# Patient Record
Sex: MALE | Race: WHITE | Employment: OTHER | URBAN - METROPOLITAN AREA
[De-identification: names, ages, dates, MRNs, and addresses within clinical notes are randomized per-mention and may not be internally consistent; named-entity substitution may affect disease eponyms.]

---

## 2017-06-23 ENCOUNTER — TELEPHONE (OUTPATIENT)
Dept: SURGERY | Age: 82
End: 2017-06-23

## 2017-06-23 ENCOUNTER — OFFICE VISIT (OUTPATIENT)
Dept: SURGERY | Age: 82
End: 2017-06-23

## 2017-06-23 VITALS
SYSTOLIC BLOOD PRESSURE: 128 MMHG | HEIGHT: 69 IN | TEMPERATURE: 97.5 F | WEIGHT: 145 LBS | DIASTOLIC BLOOD PRESSURE: 77 MMHG | BODY MASS INDEX: 21.48 KG/M2 | HEART RATE: 64 BPM

## 2017-06-23 DIAGNOSIS — C43.59 MALIGNANT MELANOMA OF CHEST WALL (HCC): Primary | ICD-10-CM

## 2017-06-23 PROCEDURE — 4040F PNEUMOC VAC/ADMIN/RCVD: CPT | Performed by: SURGERY

## 2017-06-23 PROCEDURE — 1036F TOBACCO NON-USER: CPT | Performed by: SURGERY

## 2017-06-23 PROCEDURE — 99215 OFFICE O/P EST HI 40 MIN: CPT | Performed by: SURGERY

## 2017-06-23 PROCEDURE — G8420 CALC BMI NORM PARAMETERS: HCPCS | Performed by: SURGERY

## 2017-06-23 PROCEDURE — 1123F ACP DISCUSS/DSCN MKR DOCD: CPT | Performed by: SURGERY

## 2017-06-23 PROCEDURE — G8428 CUR MEDS NOT DOCUMENT: HCPCS | Performed by: SURGERY

## 2017-06-23 RX ORDER — TADALAFIL 20 MG
TABLET ORAL
Refills: 3 | COMMUNITY
Start: 2017-03-23 | End: 2018-01-09

## 2017-06-30 ENCOUNTER — HOSPITAL ENCOUNTER (OUTPATIENT)
Dept: PREADMISSION TESTING | Age: 82
Discharge: HOME | End: 2017-06-30
Attending: SURGERY | Admitting: SURGERY

## 2017-06-30 DIAGNOSIS — C43.59 MALIGNANT MELANOMA OF OTHER PART OF TRUNK (HCC): ICD-10-CM

## 2017-07-05 ENCOUNTER — HOSPITAL ENCOUNTER (OUTPATIENT)
Dept: SURGERY | Age: 82
Discharge: HOME | End: 2017-07-05
Admitting: SURGERY

## 2017-07-05 VITALS
SYSTOLIC BLOOD PRESSURE: 138 MMHG | RESPIRATION RATE: 20 BRPM | DIASTOLIC BLOOD PRESSURE: 65 MMHG | HEIGHT: 69 IN | HEART RATE: 76 BPM | TEMPERATURE: 96.4 F | OXYGEN SATURATION: 94 % | WEIGHT: 144 LBS | BODY MASS INDEX: 21.33 KG/M2

## 2017-07-05 DIAGNOSIS — C43.59 MALIGNANT MELANOMA OF CHEST WALL (HCC): ICD-10-CM

## 2017-07-05 DIAGNOSIS — C43.59 MALIGNANT MELANOMA OF OTHER PART OF TRUNK (HCC): ICD-10-CM

## 2017-07-05 PROCEDURE — 13101 CMPLX RPR TRUNK 2.6-7.5 CM: CPT | Performed by: SURGERY

## 2017-07-05 PROCEDURE — 38900 IO MAP OF SENT LYMPH NODE: CPT | Performed by: SURGERY

## 2017-07-05 PROCEDURE — 38525 BIOPSY/REMOVAL LYMPH NODES: CPT | Performed by: SURGERY

## 2017-07-05 PROCEDURE — 11604 EXC TR-EXT MAL+MARG 3.1-4 CM: CPT | Performed by: SURGERY

## 2017-07-05 PROCEDURE — 13102 CMPLX RPR TRUNK ADDL 5CM/<: CPT | Performed by: SURGERY

## 2017-07-05 RX ORDER — FENTANYL CITRATE 50 UG/ML
25 INJECTION, SOLUTION INTRAMUSCULAR; INTRAVENOUS EVERY 5 MIN PRN
Status: DISCONTINUED | OUTPATIENT
Start: 2017-07-05 | End: 2017-07-06 | Stop reason: HOSPADM

## 2017-07-05 RX ORDER — SODIUM CHLORIDE, SODIUM LACTATE, POTASSIUM CHLORIDE, CALCIUM CHLORIDE 600; 310; 30; 20 MG/100ML; MG/100ML; MG/100ML; MG/100ML
INJECTION, SOLUTION INTRAVENOUS CONTINUOUS
Status: DISCONTINUED | OUTPATIENT
Start: 2017-07-05 | End: 2017-07-06 | Stop reason: HOSPADM

## 2017-07-05 RX ORDER — CHLORHEXIDINE GLUCONATE 0.12 MG/ML
15 RINSE ORAL 2 TIMES DAILY
Status: DISCONTINUED | OUTPATIENT
Start: 2017-07-05 | End: 2017-07-06 | Stop reason: HOSPADM

## 2017-07-05 RX ORDER — OXYCODONE HYDROCHLORIDE AND ACETAMINOPHEN 5; 325 MG/1; MG/1
1 TABLET ORAL EVERY 4 HOURS PRN
Status: DISCONTINUED | OUTPATIENT
Start: 2017-07-05 | End: 2017-07-05

## 2017-07-05 RX ORDER — OXYCODONE HYDROCHLORIDE AND ACETAMINOPHEN 5; 325 MG/1; MG/1
1 TABLET ORAL EVERY 4 HOURS PRN
Qty: 10 TABLET | Refills: 0 | Status: SHIPPED | OUTPATIENT
Start: 2017-07-05 | End: 2017-07-18

## 2017-07-05 RX ORDER — ONDANSETRON 2 MG/ML
4 INJECTION INTRAMUSCULAR; INTRAVENOUS
Status: ACTIVE | OUTPATIENT
Start: 2017-07-05 | End: 2017-07-05

## 2017-07-05 RX ORDER — OXYCODONE HYDROCHLORIDE AND ACETAMINOPHEN 5; 325 MG/1; MG/1
1 TABLET ORAL EVERY 4 HOURS PRN
Status: DISCONTINUED | OUTPATIENT
Start: 2017-07-05 | End: 2017-07-06 | Stop reason: HOSPADM

## 2017-07-05 RX ORDER — MEPERIDINE HYDROCHLORIDE 25 MG/ML
12.5 INJECTION INTRAMUSCULAR; INTRAVENOUS; SUBCUTANEOUS EVERY 5 MIN PRN
Status: DISCONTINUED | OUTPATIENT
Start: 2017-07-05 | End: 2017-07-06 | Stop reason: HOSPADM

## 2017-07-05 RX ORDER — LABETALOL HYDROCHLORIDE 5 MG/ML
5 INJECTION, SOLUTION INTRAVENOUS EVERY 10 MIN PRN
Status: DISCONTINUED | OUTPATIENT
Start: 2017-07-05 | End: 2017-07-06 | Stop reason: HOSPADM

## 2017-07-05 RX ADMIN — Medication 800 MICRO CURIE: at 08:45

## 2017-07-05 RX ADMIN — FENTANYL CITRATE 25 MCG: 50 INJECTION, SOLUTION INTRAMUSCULAR; INTRAVENOUS at 12:41

## 2017-07-05 RX ADMIN — SODIUM CHLORIDE, SODIUM LACTATE, POTASSIUM CHLORIDE, CALCIUM CHLORIDE: 600; 310; 30; 20 INJECTION, SOLUTION INTRAVENOUS at 10:04

## 2017-07-05 RX ADMIN — FENTANYL CITRATE 25 MCG: 50 INJECTION, SOLUTION INTRAMUSCULAR; INTRAVENOUS at 13:02

## 2017-07-05 RX ADMIN — FENTANYL CITRATE 25 MCG: 50 INJECTION, SOLUTION INTRAMUSCULAR; INTRAVENOUS at 12:49

## 2017-07-05 ASSESSMENT — PAIN SCALES - GENERAL
PAINLEVEL_OUTOF10: 0
PAINLEVEL_OUTOF10: 5
PAINLEVEL_OUTOF10: 5
PAINLEVEL_OUTOF10: 2
PAINLEVEL_OUTOF10: 4
PAINLEVEL_OUTOF10: 5

## 2017-07-05 ASSESSMENT — PAIN - FUNCTIONAL ASSESSMENT: PAIN_FUNCTIONAL_ASSESSMENT: 0-10

## 2017-07-05 ASSESSMENT — PAIN DESCRIPTION - PROGRESSION: CLINICAL_PROGRESSION: GRADUALLY IMPROVING

## 2017-07-10 ENCOUNTER — TELEPHONE (OUTPATIENT)
Dept: SURGERY | Age: 82
End: 2017-07-10

## 2017-07-18 ENCOUNTER — OFFICE VISIT (OUTPATIENT)
Dept: SURGERY | Age: 82
End: 2017-07-18

## 2017-07-18 VITALS
OXYGEN SATURATION: 95 % | TEMPERATURE: 97.5 F | HEART RATE: 75 BPM | DIASTOLIC BLOOD PRESSURE: 69 MMHG | SYSTOLIC BLOOD PRESSURE: 119 MMHG | WEIGHT: 144 LBS | BODY MASS INDEX: 21.27 KG/M2

## 2017-07-18 DIAGNOSIS — Z98.890 POSTOPERATIVE STATE: Primary | ICD-10-CM

## 2017-07-18 PROCEDURE — 99024 POSTOP FOLLOW-UP VISIT: CPT | Performed by: SURGERY

## 2017-08-28 ENCOUNTER — HOSPITAL ENCOUNTER (OUTPATIENT)
Dept: SURGERY | Age: 82
Discharge: HOME | End: 2017-08-28
Attending: OPHTHALMOLOGY | Admitting: OPHTHALMOLOGY

## 2017-08-28 VITALS — DIASTOLIC BLOOD PRESSURE: 82 MMHG | SYSTOLIC BLOOD PRESSURE: 144 MMHG

## 2017-08-28 RX ORDER — TROPICAMIDE 10 MG/ML
1 SOLUTION/ DROPS OPHTHALMIC ONCE
Status: COMPLETED | OUTPATIENT
Start: 2017-08-28 | End: 2017-08-28

## 2017-08-28 RX ORDER — PROPARACAINE HYDROCHLORIDE 5 MG/ML
1 SOLUTION/ DROPS OPHTHALMIC ONCE
Status: COMPLETED | OUTPATIENT
Start: 2017-08-28 | End: 2017-08-28

## 2017-08-28 RX ADMIN — PROPARACAINE HYDROCHLORIDE 1 DROP: 5 SOLUTION/ DROPS OPHTHALMIC at 12:15

## 2017-08-28 RX ADMIN — TROPICAMIDE 1 DROP: 10 SOLUTION/ DROPS OPHTHALMIC at 12:16

## 2017-12-06 ENCOUNTER — PAT TELEPHONE (OUTPATIENT)
Dept: PREADMISSION TESTING | Age: 82
End: 2017-12-06

## 2017-12-06 VITALS — WEIGHT: 146 LBS | BODY MASS INDEX: 22.13 KG/M2 | HEIGHT: 68 IN

## 2017-12-06 NOTE — PRE-PROCEDURE INSTRUCTIONS
[address] [facility] time_1200___________        Surgery time_1330___________    Take the following medications with a sip of water: Protonix    Do not eat or drink anything after 12:00 midnight prior to your surgery. This includes water chewing gum, mints and ice chips. You may brush your teeth and gargle the morning of your surgery, but do not swallow the water     Please see your family doctor/pediatrician for a history and physical and/or concerning medications. Bring any test results/reports from your physicians office. If you are under the care of a heart doctor or specialist doctor, please be aware that you may be asked to them for clearance    You may be asked to stop blood thinners such as Coumadin, Plavix, Fragmin, Lovenox, etc., or any anti-inflammatories such as:  Aspirin, Ibuprofen, Advil, Naproxen prior to your surgery. We also ask that you stop any OTC medications such as fish oil, vitamin E, glucosamine, garlic, Multivitamins, COQ 10, etc.    We ask that you do not smoke 24 hours prior to surgery  We ask that you do not  drink any alcoholic beverages 24 hours prior to surgery     You must make arrangements for a responsible adult to take you home after your surgery. For your safety you will not be allowed to leave alone or drive yourself home. Your surgery will be cancelled if you do not have a ride home. Also for your safety, it is strongly suggested that someone stay with you the first 24 hours after your surgery. A parent or legal guardian must accompany a child scheduled for surgery and plan to stay at the hospital until the child is discharged. Please do not bring other children with you. For your comfort, please wear simple loose fitting clothing to the hospital.  Please do not bring valuables.     Do not wear any make-up or nail polish on your fingers or toes      For your safety, please do not wear any jewelry or body piercing's on the day of surgery. All jewelry must be removed. If you have dentures, they will be removed before going to operating room. For your convenience, we will provide you with a container. If you wear contact lenses or glasses, they will be removed, please bring a case for them. If you have a living will and a durable power of  for healthcare, please bring in a copy. As part of our patient safety program to minimize surgical site infections, we ask you to do the following:    · Please notify your surgeon if you develop any illness between         now and the  day of your surgery. · This includes a cough, cold, fever, sore throat, nausea,         or vomiting, and diarrhea, etc.  ·  Please notify your surgeon if you experience dizziness, shortness         of breath or blurred vision between now and the time of your surgery. Do not shave your operative site 96 hours prior to surgery. For face and neck surgery, men may use an electric razor 48 hours   prior to surgery. You may shower the night before surgery or the morning of   your surgery with an antibacterial soap. You will need to bring a photo ID and insurance card    [facility] has an onsite pharmacy, would you like to utilize our pharmacy     If you will be staying overnight and use a C-pap machine, please bring   your C-pap to hospital     Our goal is to provide you with excellent care, therefore, visitors will be limited to two(2) in the room at a time so that we may focus on providing this care for you. Please contact pre-admission testing if you have any further questions. [facility] phone number:  [phone] [facility] fax number:  [phone]  Please note these are generalized instructions for all surgical cases, you may be provided with more specific instructions according to your surgery.

## 2017-12-07 ENCOUNTER — HOSPITAL ENCOUNTER (OUTPATIENT)
Dept: ENDOSCOPY | Age: 82
Discharge: HOME | End: 2017-12-07
Attending: INTERNAL MEDICINE | Admitting: INTERNAL MEDICINE

## 2017-12-07 VITALS
TEMPERATURE: 96.9 F | OXYGEN SATURATION: 94 % | RESPIRATION RATE: 18 BRPM | WEIGHT: 144 LBS | SYSTOLIC BLOOD PRESSURE: 150 MMHG | DIASTOLIC BLOOD PRESSURE: 81 MMHG | HEART RATE: 60 BPM | BODY MASS INDEX: 21.82 KG/M2 | HEIGHT: 68 IN

## 2017-12-07 RX ORDER — SODIUM CHLORIDE 9 MG/ML
INJECTION, SOLUTION INTRAVENOUS CONTINUOUS
Status: DISCONTINUED | OUTPATIENT
Start: 2017-12-07 | End: 2017-12-08 | Stop reason: HOSPADM

## 2017-12-07 RX ADMIN — SODIUM CHLORIDE: 9 INJECTION, SOLUTION INTRAVENOUS at 12:16

## 2017-12-07 ASSESSMENT — PAIN SCALES - GENERAL
PAINLEVEL_OUTOF10: 0

## 2017-12-07 ASSESSMENT — LIFESTYLE VARIABLES: SMOKING_STATUS: 0

## 2017-12-07 ASSESSMENT — ENCOUNTER SYMPTOMS: SHORTNESS OF BREATH: 0

## 2017-12-07 ASSESSMENT — PAIN - FUNCTIONAL ASSESSMENT: PAIN_FUNCTIONAL_ASSESSMENT: 0-10

## 2017-12-07 NOTE — PROGRESS NOTES
Tolerating fluids moreira. Also had several cookies & tolerated them. Both patient & family expressed understanding of discharge instructions.

## 2017-12-07 NOTE — OP NOTE
[address] [place] [address]  Endoscopy Note    Patient: Elpidio Beverly   : 1931  Acct#:     Procedure: Endoscopic ultrasound  EGD with biopsy    Date: 2017    Surgeon:  Sj Omalley MD    Referring Physician:  Dr. Senait Bender    Anesthesia:  MAC per Anesthesia. Indications: This is a 80y.o. year old male who presents today with a pre-pyloric subepithelial lesion for EUS. Consent: Risks, benefits, and alternatives were explained and informed consent was obtained. Monitoring:  Patient was monitored with continuous pulse oximetry, telemetry, and intermittent blood pressures. Details of the Procedure: The patient was then taken to the endoscopy suite. A time-out was performed. The patient and staff were in agreement as to the correct patient and procedure. The above anesthesia was administered by the anesthesia department. The patient was placed in the left lateral position. The Olympus radial echoendoscope followed by the upper endoscope were placed in the patient's mouth and under direct visualization passed into the esophagus and advanced without difficulty to the 2nd portion of the duodenum. Views were good, patient toleration was good. Retroflexion was performed in the stomach. Findings:  1. The esophagus appeared normal without evidence of Mustafa's esophagus or reflux esophagitis. 2.  Sliding hiatal hernia  3.  1cm subepithelial lesion in the antrum of the stomach on the greater curve. By EUS this involved the 2nd wall layer and was elliptical and well demarcated. It was mildly hypoechoic. No perigastric adenopathy. Incidental note was made of a 6.2 x 4.8 mm anechoic cyst without intramural nodule in the body of the pancreas. 4.  Normal duodenum. Dr. Thomasene Leventhal was in the unit so I brought him into the room to discuss EMR vs. Observation. We both decided that in an 80 y.o., risk of EMR outweighs benefit. I then performed pinhole biopsies with jumbo forceps. The first biopsy did open up the mucosa and directly show the lesion which was biopsied directly with jumbo forceps so we should get a tissue diagnosis. The duodenum and stomach were decompressed and the scope was withdrawn from the patient. The patient tolerated the procedure well and was taken to the post anesthesia care unit in good condition. Doppler Interpretation:  Doppler evaluation was performed and interpreted on the spot by the endoscopist without the presence of a radiologist.      Specimens taken: yes  Estimated blood loss: minimal      Impression:  Gastric subepithelial lesion biopsied. This was 1.28 x 0.38 cm and appeared to involve the muscularis mucosa. It would be amenable to EMR if deemed necessary based on pathology. DDx is pancreatic rest vs. Leiomyoma vs. GIST. The overlying mucosa was unroofed with jumbo forcep and biopsies were taken directly from the lesion. Recommendations:  1. Clear liquid diet advance as tolerated. 2.  Follow up on the biopsies.     Luis Fernando Rodriguez

## 2017-12-07 NOTE — ANESTHESIA PRE-OP
[facility] Department of Anesthesiology  Pre-Anesthesia Evaluation/Consultation       Name:  Mee Burrell  : 1931  Age:  80 y.o. MRN:  [id number]  Date: 2017           Procedure (Scheduled):  EUS  Surgeon:  Dr. 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  GERD (gastroesophageal reflux disease)     History of blood transfusion     Hypercholesterolemia     Hypertension     Ocular migraine     Osteoarthritis     PUD (peptic ulcer disease)     Schatzki's ring     Vertigo      Past Surgical History:   Procedure Laterality Date    CATARACT REMOVAL      COLONOSCOPY      COLONOSCOPY  9/15    A. Safdi - 3 mm distal sigmoid polyp    INGUINAL HERNIA REPAIR Left [id number]    LAPAROSCOPIC LEFT INGUINAL HERNIA REPAIR    MOHS SURGERY  1998    NASAL SEPTUM SURGERY      OTHER SURGICAL HISTORY Right 2017    wide excision chest wall melanoma w/sentinel node biopsy    POLYPECTOMY  2007    TONSILLECTOMY      TOTAL KNEE ARTHROPLASTY Left 4/15    Dr. Salena Retana - [address]      Neg for Barretts - A. mouth.      Fexofenadine HCl (ALLEGRA PO) Take 1 capsule by mouth daily as needed       pantoprazole (PROTONIX) 40 MG tablet Take 40 mg by mouth daily.  ibuprofen (ADVIL;MOTRIN) 200 MG tablet Take 200 mg by mouth as needed. STOP 14 FOR OR      Glucosamine-Chondroitin 7221-6590 MG/30ML LIQD 2 daily  Indications: STOP FOR OR 14       No current facility-administered medications for this encounter. Vital Signs (Current)   Vitals:    17 1156   BP: 134/77   Pulse: 62   Resp: 16   Temp: 97.6 °F (36.4 °C)   SpO2: 99%     Vital Signs Statistics (for past 48 hrs)     Temp  Av.6 °F (36.4 °C)  Min: 97.6 °F (36.4 °C)   Min taken time: 17 1156  Max: 97.6 °F (36.4 °C)   Max taken time: 17 1156  Pulse  Av  Min: 58   Min taken time: 17 1156  Max: 58   Max taken time: 17 1156  Resp  Av  Min: 16   Min taken time: 17 1156  Max: 16   Max taken time: 17 1156  BP  Min: 134/77   Min taken time: 17 1156  Max: 134/77   Max taken time: 17 1156  SpO2  Av %  Min: 99 %   Min taken time: 17 1156  Max: 99 %   Max taken time: 17 1156    BP Readings from Last 3 Encounters:   17 134/77   17 (!) 144/82   17 119/69     BMI  Body mass index is 21.9 kg/m². Estimated body mass index is 21.9 kg/m² as calculated from the following:    Height as of this encounter: 5' 8\" (1.727 m). Weight as of this encounter: 144 lb (65.3 kg).     CBC   Lab Results   Component Value Date    WBC 6.0 2016    RBC 4.24 2016    HGB 13.2 2016    HCT 39.0 2016    MCV 91.9 2016    RDW 13.9 2016     2016     CMP    Lab Results   Component Value Date     2016    K 4.4 2016    CL 95 2016    CO2 26 2016    BUN 20 2016    CREATININE 1.1 2016    GFRAA >60 2016    GFRAA >60 2013    LABGLOM >60 2016    GLUCOSE 94 2016    CALCIUM 8.8 2016    AST 27 10/18/2012     BMP    Lab Results   Component Value Date     09/21/2016    K 4.4 09/21/2016    CL 95 09/21/2016    CO2 26 09/21/2016    BUN 20 09/21/2016    CREATININE 1.1 09/21/2016    CALCIUM 8.8 09/21/2016    GFRAA >60 09/21/2016    GFRAA >60 04/23/2013    LABGLOM >60 09/21/2016    GLUCOSE 94 09/21/2016     POCGlucose  No results for input(s): GLUCOSE in the last 72 hours. Coags  No results found for: PROTIME, INR, APTT  HCG (If Applicable) No results found for: PREGTESTUR, PREGSERUM, HCG, HCGQUANT   ABGs No results found for: PHART, PO2ART, HAF5WWV, ERT5EOK, BEART, S9WWMAMF   Type & Screen (If Applicable)  No results found for: LABABO, LABRH                         BMI: Wt Readings from Last 3 Encounters:       NPO Status:  > 8 h                          Anesthesia Evaluation  Patient summary reviewed and Nursing notes reviewed no history of anesthetic complications:   Airway: Mallampati: II  TM distance: >3 FB   Neck ROM: full  Mouth opening: > = 3 FB Dental: normal exam         Pulmonary:Negative Pulmonary ROS and normal exam  breath sounds clear to auscultation      (-) pneumonia, COPD, asthma, shortness of breath, recent URI, sleep apnea and not a current smoker                           Cardiovascular:  Exercise tolerance: good (>4 METS),   (+) hypertension:,     (-) past MI, CABG/stent,  angina and orthopnea      Rhythm: regular  Rate: normal           Beta Blocker:  Not on Beta Blocker         Neuro/Psych:   Negative Neuro/Psych ROS  (+) headaches: migraine headaches,    (-) seizures, neuromuscular disease, TIA, CVA, psychiatric history and depression/anxiety            GI/Hepatic/Renal:   (+) GERD: well controlled, PUD,      (-) hiatal hernia, hepatitis, liver disease, no renal disease, bowel prep and no morbid obesity       Endo/Other: Negative Endo/Other ROS   (+) no malignancy/cancer.     (-) hypothyroidism, hyperthyroidism, blood dyscrasia, arthritis, no Type II DM, no Type I DM, no

## 2018-01-09 ENCOUNTER — OFFICE VISIT (OUTPATIENT)
Dept: SURGERY | Age: 83
End: 2018-01-09

## 2018-01-09 VITALS
DIASTOLIC BLOOD PRESSURE: 70 MMHG | OXYGEN SATURATION: 96 % | BODY MASS INDEX: 22.5 KG/M2 | WEIGHT: 148 LBS | SYSTOLIC BLOOD PRESSURE: 122 MMHG | TEMPERATURE: 97.8 F | HEART RATE: 69 BPM

## 2018-01-09 DIAGNOSIS — C43.59 MALIGNANT MELANOMA OF CHEST WALL (HCC): Primary | ICD-10-CM

## 2018-01-09 PROCEDURE — 1123F ACP DISCUSS/DSCN MKR DOCD: CPT | Performed by: SURGERY

## 2018-01-09 PROCEDURE — 4040F PNEUMOC VAC/ADMIN/RCVD: CPT | Performed by: SURGERY

## 2018-01-09 PROCEDURE — 99213 OFFICE O/P EST LOW 20 MIN: CPT | Performed by: SURGERY

## 2018-01-09 PROCEDURE — 1036F TOBACCO NON-USER: CPT | Performed by: SURGERY

## 2018-01-09 PROCEDURE — G8484 FLU IMMUNIZE NO ADMIN: HCPCS | Performed by: SURGERY

## 2018-01-09 PROCEDURE — G8427 DOCREV CUR MEDS BY ELIG CLIN: HCPCS | Performed by: SURGERY

## 2018-01-09 PROCEDURE — G8420 CALC BMI NORM PARAMETERS: HCPCS | Performed by: SURGERY

## 2018-01-09 NOTE — PROGRESS NOTES
Date of service: 1/9/2018  Sanjuana Mathews    Initial Melanoma:   Location: right chest wall    Date initially Treated 7/5/17    Thickness: 0.69 mm   Level: 4    Ulceration: no     Regression: no    Mitotic rate: 1 per sq. 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 Subsequent Melanoma: no    Sanjuana Mathews returns for follow up of his melanoma of right chest wall. He is basically doing well. Today He denies new subcutaneous mass, headache, bone pain, cough, abdominal pain, or suspicious new lesions. He is being followed by Dr. Zeina Harris. No other complaints. Review of systems is otherwise negative    Past medical history, Past surgical history, Social history, Family history and allergies reviewed and updated. Physical Exam:    General: healthy appearing white male  HEENT: Sclera nonicteric. Skin:    Incision normal: Yes   Surrounding nodularity: No   Abnormal pigmentation: No   Other lesions: No  Lymph nodes: Palpable regional lymph nodes: No  Abdomen: Soft, non-tender, without palpable liver or mass    Impression:     No evidence of recurrent melanoma  No evidence of new primary melanoma    Plan: Patient has early stage melanoma. The chance of developing a new melanoma is higher than recurrence of current melanoma. Thus importance of follow up with dermatologist is explained. Symptoms and signs of recurrence explained including headache, jaundice, cough, abdominal pain and blood in stools. Follow up in 6 months.

## 2018-01-09 NOTE — LETTER
[facility]) Surgical Oncology and General Surgery  [facility] MD  [address] [address]  Phone: [phone]  Fax: [phone]    1/9/2018     Patient: Eduardo Parker   MR Number: [id number]    YOB: 1931     Thank you for referring Eduardo Parker to me for evaluation. Below are the relevant portions of my assessment and plan of care. Date of service: 1/9/2018  Eduardo Parker    Initial Melanoma:   Location: right chest wall    Date initially Treated 7/5/17    Thickness: 0.69 mm   Level: 4    Ulceration: no     Regression: no    Mitotic rate: 1 per sq. 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 Subsequent Melanoma: no    Eduardo Parker returns for follow up of his melanoma of right chest wall. He is basically doing well. Today He denies new subcutaneous mass, headache, bone pain, cough, abdominal pain, or suspicious new lesions. He is being followed by Dr. Seema Cabezas. No other complaints. Review of systems is otherwise negative    Past medical history, Past surgical history, Social history, Family history and allergies reviewed and updated. Physical Exam:    General: healthy appearing white male  HEENT: Sclera nonicteric. Skin:    Incision normal: Yes   Surrounding nodularity: No   Abnormal pigmentation: No   Other lesions: No  Lymph nodes: Palpable regional lymph nodes: No  Abdomen: Soft, non-tender, without palpable liver or mass    Impression:     No evidence of recurrent melanoma  No evidence of new primary melanoma    Plan: Patient has early stage melanoma. The chance of developing a new melanoma is higher than recurrence of current melanoma. Thus importance of follow up with dermatologist is explained. Symptoms and signs of recurrence explained including headache, jaundice, cough, abdominal pain and blood in stools. Follow up in 6 months. If you have questions, please do not hesitate to call me.  I look forward

## 2018-07-17 ENCOUNTER — OFFICE VISIT (OUTPATIENT)
Dept: SURGERY | Age: 83
End: 2018-07-17

## 2018-07-17 VITALS
WEIGHT: 148 LBS | SYSTOLIC BLOOD PRESSURE: 129 MMHG | HEART RATE: 56 BPM | HEIGHT: 68 IN | DIASTOLIC BLOOD PRESSURE: 73 MMHG | OXYGEN SATURATION: 98 % | TEMPERATURE: 97.9 F | BODY MASS INDEX: 22.43 KG/M2

## 2018-07-17 DIAGNOSIS — C43.59 MALIGNANT MELANOMA OF CHEST WALL (HCC): Primary | ICD-10-CM

## 2018-07-17 PROCEDURE — G8420 CALC BMI NORM PARAMETERS: HCPCS | Performed by: SURGERY

## 2018-07-17 PROCEDURE — G8427 DOCREV CUR MEDS BY ELIG CLIN: HCPCS | Performed by: SURGERY

## 2018-07-17 PROCEDURE — 1101F PT FALLS ASSESS-DOCD LE1/YR: CPT | Performed by: SURGERY

## 2018-07-17 PROCEDURE — 4040F PNEUMOC VAC/ADMIN/RCVD: CPT | Performed by: SURGERY

## 2018-07-17 PROCEDURE — 1036F TOBACCO NON-USER: CPT | Performed by: SURGERY

## 2018-07-17 PROCEDURE — 99213 OFFICE O/P EST LOW 20 MIN: CPT | Performed by: SURGERY

## 2018-07-17 PROCEDURE — 1123F ACP DISCUSS/DSCN MKR DOCD: CPT | Performed by: SURGERY
